# Patient Record
Sex: MALE | Race: WHITE | NOT HISPANIC OR LATINO | URBAN - METROPOLITAN AREA
[De-identification: names, ages, dates, MRNs, and addresses within clinical notes are randomized per-mention and may not be internally consistent; named-entity substitution may affect disease eponyms.]

---

## 2024-08-23 ENCOUNTER — APPOINTMENT (OUTPATIENT)
Dept: URBAN - METROPOLITAN AREA SURGERY 17 | Age: 69
Setting detail: DERMATOLOGY
End: 2024-08-26

## 2024-08-23 PROBLEM — C44.212 BASAL CELL CARCINOMA OF SKIN OF RIGHT EAR AND EXTERNAL AURICULAR CANAL: Status: ACTIVE | Noted: 2024-08-23

## 2024-08-23 PROCEDURE — OTHER CONSULTATION FOR MOHS SURGERY: OTHER

## 2024-08-23 PROCEDURE — OTHER MOHS SURGERY: OTHER

## 2024-08-23 PROCEDURE — 17312 MOHS ADDL STAGE: CPT

## 2024-08-23 PROCEDURE — 17311 MOHS 1 STAGE H/N/HF/G: CPT

## 2024-08-23 PROCEDURE — OTHER MIPS QUALITY: OTHER

## 2024-08-23 NOTE — PROCEDURE: MOHS SURGERY
Body Location Override (Optional - Billing Will Still Be Based On Selected Body Map Location If Applicable): right helix
Mohs Case Number: [id number]
Date Of Previous Biopsy (Optional): 7/11/2024
Previous Accession (Optional): [id number]
Biopsy Photograph Reviewed: Yes
Referring Physician (Optional): Judy Marley MD
Consent Type: Consent 1 (Standard)
Eye Shield Used: No
Initial Size Of Lesion: 0.5
X Size Of Lesion In Cm (Optional): 0.6
Number Of Stages: 3
Primary Defect Length In Cm (Final Defect Size - Required For Flaps/Grafts): 1
Primary Defect Width In Cm (Final Defect Size - Required For Flaps/Grafts): 0.9
Primary Defect Depth In Cm (Optional But Required For Some Insurers): 0
Repair Type: No repair - secondary intention
Which Instrument Did You Use For Dermabrasion?: Wire Brush
Which Eyelid Repair Cpt Are You Using?: 77912
Oculoplastic Surgeon Procedure Text (A): After obtaining clear surgical margins the patient was sent to oculoplastics for surgical repair.  The patient understands they will receive post-surgical care and follow-up from the referring physician's office.
Otolaryngologist Procedure Text (A): After obtaining clear surgical margins the patient was sent to otolaryngology for surgical repair.  The patient understands they will receive post-surgical care and follow-up from the referring physician's office.
Plastic Surgeon Procedure Text (A): After obtaining clear surgical margins the patient was sent to plastics for surgical repair.  The patient understands they will receive post-surgical care and follow-up from the referring physician's office.
Mid-Level Procedure Text (A): After obtaining clear surgical margins the patient was sent to a mid-level provider for surgical repair.  The patient understands they will receive post-surgical care and follow-up from the mid-level provider.
Provider Procedure Text (A): After obtaining clear surgical margins the defect was repaired by another provider.
Asc Procedure Text (A): After obtaining clear surgical margins the patient was sent to an ASC for surgical repair.  The patient understands they will receive post-surgical care and follow-up from the ASC physician.
Suturegard Retention Suture: 2-0 Nylon
Retention Suture Bite Size: 3 mm
Length To Time In Minutes Device Was In Place: 10
Undermining Type: Entire Wound
Debridement Text: The wound edges were debrided prior to proceeding with the closure to facilitate wound healing.
Helical Rim Text: The closure involved the helical rim.
Vermilion Border Text: The closure involved the vermilion border.
Nostril Rim Text: The closure involved the nostril rim.
Retention Suture Text: Retention sutures were placed to support the closure and prevent dehiscence.
Area H Indication Text: Tumors in this location are included in Area H (eyelids, eyebrows, nose, lips, chin, ear, pre-auricular, post-auricular, temple, genitalia, hands, feet, ankles and areola).  Tissue conservation is critical in these anatomic locations.
Area M Indication Text: Tumors in this location are included in Area M (cheek, forehead, scalp, neck, jawline and pretibial skin).  Mohs surgery is indicated for tumors in these anatomic locations.
Area L Indication Text: Tumors in this location are included in Area L (trunk and extremities).  Mohs surgery is indicated for larger tumors, or tumors with aggressive histologic features, in these anatomic locations.
Depth Of Tumor Invasion (For Histology): dermis
Perineural Invasion (For Histology - Be Specific If Possible): absent
Special Stains Stage 1 - Results: Base On Clearance Noted Above
Histology Selection Override (Optional- Will Default To Parent Diagnosis If N/A): Nodular Basal Cell Carcinoma
Stage 2: Additional Anesthesia Type: 1% lidocaine with epinephrine
Staging Info: By selecting yes to the question above you will include information on AJCC 8 tumor staging in your Mohs note. Information on tumor staging will be automatically added for SCCs on the head and neck. AJCC 8 includes tumor size, tumor depth, perineural involvement and bone invasion.
Tumor Depth: Less than 6mm from granular layer and no invasion beyond the subcutaneous fat
Why Was The Change Made?: Please Select the Appropriate Response
Medical Necessity Statement: The patient's skin cancer diagnosis, prognosis, and treatment options were discussed in detail, including nature of non-melanoma skin cancer, and its potential to metastasize, cause local anatomic tissue destruction, and other complications. Multiple treatment options were considered today to ensure Mohs is not only appropriate according to the AUC, but also the best treatment option considering the patient’s age, medical health, type and location of tumor, and other surgical risk factors. Other options such as standard excision, destruction, CO2 laser, XRT, and even no surgery were considered in the decision making process. The relative risks, benefits, possible complications, outcomes, and unique aspects of each method in the specific context of this cancer(s) were all evaluated. Mohs surgery was then chosen today as the best option, and a detailed verbal explanation of Mohs surgery was given.
Alternatives Discussed Intro (Do Not Add Period): I discussed alternative treatments to Mohs surgery and specifically discussed the risks and benefits of
Consent 1/Introductory Paragraph: The rationale for Mohs was explained to the patient and consent was obtained. The risks, benefits and alternatives to therapy were discussed in detail. Specifically, the risks of infection, scarring, bleeding, prolonged wound healing, incomplete removal, allergy to anesthesia, nerve injury and recurrence were addressed. Prior to the procedure, the treatment site was clearly identified and confirmed by the patient. All components of Universal Protocol/PAUSE Rule completed.
Consent 2/Introductory Paragraph: Mohs surgery was explained to the patient and consent was obtained. The risks, benefits and alternatives to therapy were discussed in detail. Specifically, the risks of infection, scarring, bleeding, prolonged wound healing, incomplete removal, allergy to anesthesia, nerve injury and recurrence were addressed. Prior to the procedure, the treatment site was clearly identified and confirmed by the patient. All components of Universal Protocol/PAUSE Rule completed.
Consent 3/Introductory Paragraph: I gave the patient a chance to ask questions they had about the procedure.  Following this I explained the Mohs procedure and consent was obtained. The risks, benefits and alternatives to therapy were discussed in detail. Specifically, the risks of infection, scarring, bleeding, prolonged wound healing, incomplete removal, allergy to anesthesia, nerve injury and recurrence were addressed. Prior to the procedure, the treatment site was clearly identified and confirmed by the patient. All components of Universal Protocol/PAUSE Rule completed.
Consent (Temporal Branch)/Introductory Paragraph: The rationale for Mohs was explained to the patient and consent was obtained. The risks, benefits and alternatives to therapy were discussed in detail. Specifically, the risks of damage to the temporal branch of the facial nerve, infection, scarring, bleeding, prolonged wound healing, incomplete removal, allergy to anesthesia, and recurrence were addressed. Prior to the procedure, the treatment site was clearly identified and confirmed by the patient. All components of Universal Protocol/PAUSE Rule completed.
Consent (Marginal Mandibular)/Introductory Paragraph: The rationale for Mohs was explained to the patient and consent was obtained. The risks, benefits and alternatives to therapy were discussed in detail. Specifically, the risks of damage to the marginal mandibular branch of the facial nerve, infection, scarring, bleeding, prolonged wound healing, incomplete removal, allergy to anesthesia, and recurrence were addressed. Prior to the procedure, the treatment site was clearly identified and confirmed by the patient. All components of Universal Protocol/PAUSE Rule completed.
Consent (Spinal Accessory)/Introductory Paragraph: The rationale for Mohs was explained to the patient and consent was obtained. The risks, benefits and alternatives to therapy were discussed in detail. Specifically, the risks of damage to the spinal accessory nerve, infection, scarring, bleeding, prolonged wound healing, incomplete removal, allergy to anesthesia, and recurrence were addressed. Prior to the procedure, the treatment site was clearly identified and confirmed by the patient. All components of Universal Protocol/PAUSE Rule completed.
Consent (Near Eyelid Margin)/Introductory Paragraph: The rationale for Mohs was explained to the patient and consent was obtained. The risks, benefits and alternatives to therapy were discussed in detail. Specifically, the risks of ectropion or eyelid deformity, infection, scarring, bleeding, prolonged wound healing, incomplete removal, allergy to anesthesia, nerve injury and recurrence were addressed. Prior to the procedure, the treatment site was clearly identified and confirmed by the patient. All components of Universal Protocol/PAUSE Rule completed.
Consent (Ear)/Introductory Paragraph: The rationale for Mohs was explained to the patient and consent was obtained. The risks, benefits and alternatives to therapy were discussed in detail. Specifically, the risks of ear deformity, infection, scarring, bleeding, prolonged wound healing, incomplete removal, allergy to anesthesia, nerve injury and recurrence were addressed. Prior to the procedure, the treatment site was clearly identified and confirmed by the patient. All components of Universal Protocol/PAUSE Rule completed.
Consent (Nose)/Introductory Paragraph: The rationale for Mohs was explained to the patient and consent was obtained. The risks, benefits and alternatives to therapy were discussed in detail. Specifically, the risks of nasal deformity, changes in the flow of air through the nose, infection, scarring, bleeding, prolonged wound healing, incomplete removal, allergy to anesthesia, nerve injury and recurrence were addressed. Prior to the procedure, the treatment site was clearly identified and confirmed by the patient. All components of Universal Protocol/PAUSE Rule completed.
Consent (Lip)/Introductory Paragraph: The rationale for Mohs was explained to the patient and consent was obtained. The risks, benefits and alternatives to therapy were discussed in detail. Specifically, the risks of lip deformity, changes in the oral aperture, infection, scarring, bleeding, prolonged wound healing, incomplete removal, allergy to anesthesia, nerve injury and recurrence were addressed. Prior to the procedure, the treatment site was clearly identified and confirmed by the patient. All components of Universal Protocol/PAUSE Rule completed.
Consent (Scalp)/Introductory Paragraph: The rationale for Mohs was explained to the patient and consent was obtained. The risks, benefits and alternatives to therapy were discussed in detail. Specifically, the risks of changes in hair growth pattern secondary to repair, infection, scarring, bleeding, prolonged wound healing, incomplete removal, allergy to anesthesia, nerve injury and recurrence were addressed. Prior to the procedure, the treatment site was clearly identified and confirmed by the patient. All components of Universal Protocol/PAUSE Rule completed.
Detail Level: Detailed
Postop Diagnosis: same
Surgeon: Riley Blanton MD
Anesthesia Type: 1% lidocaine with 1:100,000 epinephrine and 408mcg clindamycin/ml and a 1:10 solution of 8.4% sodium bicarbonate
Hemostasis: Electrocautery
Estimated Blood Loss (Cc): minimal
Anesthesia Type: 1% lidocaine with 1:100,000 epinephrine and 408mcg clindamycin/ml and a 1:3 solution of 8.4% sodium bicarbonate
Anesthesia Volume In Cc: 6
Brow Lift Text: A midfrontal incision was made medially to the defect to allow access to the tissues just superior to the left eyebrow. Following careful dissection inferiorly in a supraperiosteal plane to the level of the left eyebrow, several 3-0 monocryl sutures were used to resuspend the eyebrow orbicularis oculi muscular unit to the superior frontal bone periosteum. This resulted in an appropriate reapproximation of static eyebrow symmetry and correction of the left brow ptosis.
Deep Sutures: 5-0 Vicryl
Epidermal Sutures: 5-0 Fast Absorbing Gut
Epidermal Closure: running
Suturegard Intro: Intraoperative tissue expansion was performed, utilizing the SUTUREGARD device, in order to reduce wound tension.
Suturegard Body: The suture ends were repeatedly re-tightened and re-clamped to achieve the desired tissue expansion.
Hemigard Intro: Due to skin fragility and wound tension, it was decided to use HEMIGARD adhesive retention suture devices to permit a linear closure. The skin was cleaned and dried for a 6cm distance away from the wound. Excessive hair, if present, was removed to allow for adhesion.
Hemigard Postcare Instructions: The HEMIGARD strips are to remain completely dry for at least 5-7 days.
Donor Site Anesthesia Type: same as repair anesthesia
Graft Basting Suture (Optional): 5-0 Ethilon
Graft Donor Site Epidermal Sutures (Optional): cyanoacrylate
Epidermal Closure Graft Donor Site (Optional): simple interrupted
Graft Donor Site Bandage (Optional-Leave Blank If You Don't Want In Note): A pressure bandage were applied to the donor site.
Closure 2 Information: This tab is for additional flaps and grafts, including complex repair and grafts and complex repair and flaps. You can also specify a different location for the additional defect, if the location is the same you do not need to select a new one. We will insert the automated text for the repair you select below just as we do for solitary flaps and grafts. Please note that at this time if you select a location with a different insurance zone you will need to override the ICD10 and CPT if appropriate.
Closure 3 Information: This tab is for additional flaps and grafts above and beyond our usual structured repairs.  Please note if you enter information here it will not currently bill and you will need to add the billing information manually.
Wound Care: A&D Ointment
Dressing: pressure dressing with telfa
Wound Care (No Sutures): Petrolatum
Dressing (No Sutures): dry sterile dressing
Wound Check: 21 days
Unna Boot Text: An Unna boot was placed to help immobilize the limb and facilitate more rapid healing.
Home Suture Removal Text: Patient was provided instructions on removing sutures and will remove their sutures at home.  If they have any questions or difficulties they will call the office.
Post-care instructions were reviewed in detail. A handout was provided. Patient is not to engage in any heavy lifting, exercise, or swimming for the next 14 days. Should the patient develop any fevers, chills, bleeding, severe pain patient will contact the office immediately.\\n\\nA dose of both Tylenol 1000mg and ibuprofen 400mg (at the same time) was recommended shortly after surgery, and every 6 hours as needed for pain. Pain not adequately relieved or severe pain should be reported to our office or the provider on call. Avoid any additional aspirin containing products. Cell phone numbers of the providers were handed out. \\n\\nIce packs should also be used post surgically and may be placed over the wound dressing to help with pain and swelling, and bleeding. The ice pack is placed over the wound for fifteen minutes and may be repeated four to six times per day for 2-3 days.
Opioid Counseling: The patient was advised that opioid pain medications are addictive, can cause nausea, and vomiting, and are rarely needed after Mohs surgery. Proper use of acetaminophen, ibuprofen, and ice is usually sufficient.
Pain Refusal Text: I offered to prescribe pain medication but the patient refused to take this medication.
Mauc Instructions: By selecting yes to the question below the MAUC number will be added into the note.  This will be calculated automatically based on the diagnosis chosen, the size entered, the body zone selected (H,M,L) and the specific indications you chose. You will also have the option to override the Mohs AUC if you disagree with the automatically calculated number and this option is found in the Case Summary tab.
Where Do You Want The Question To Include Opioid Counseling Located?: Case Summary Tab
Eye Protection Verbiage: Before proceeding with the stage, a plastic scleral shield was inserted. The globe was anesthetized with a few drops of proparacaine. Then, an appropriate sized scleral shield was chosen and coated with lacrilube ointment. The shield was gently inserted and left in place for the duration of each stage. After the stage was completed, the shield was gently removed.
Mohs Method Verbiage: A  time out was conducted by the surgical team prior to incision to ensure that the correct patient, location, and diagnosis were all confirmed before the procedure was started. Patient was positioned for optimal exposure of surgical site and to patient's comfort. Skin was cleansed and anesthetized as noted above.  A thin layer of tissue was surgically excised using a #15 blade by Dr. Blanton. The tissue specimen was then transferred onto a gauze pad, maintaining the orientation of the specimen. The gauze was placed with the fold superior. Hemostasis was obtained using electro-coagulation. The wound site was then covered with a dressing while tissue samples were processed for examination.
Surgeon/Pathologist Verbiage (Will Incorporate Name Of Surgeon From Intro If Not Blank): operated in two distinct and integrated capacities as the surgeon and pathologist.
Mohs Histo Method Verbiage: A reference map was drawn during the excision, and the excised tissue was cut into sections for frozen sectioning in the histologic laboratory. Two adjacent edges of each section were then dyed in order to achieve precise orientation. Orange, blue, and black dyes were used with black dyed edges correlating to broken lines and orange edges with lines of \"O\" shapes and blue edges with lines of \"+\" symbols on the surgical map. The tissue sections were processed using horizontal sectioning of the base and continuous peripheral margins. \\n\\nA  time out was conducted by Dr Blanton and the laboratory team to ensure that the correct patient, diagnosis and location, and Stage letter/number and Section number were all confirmed before the slides were reviewed. The team double checked these four areas before proceeding with slide review. Dr. Blanton then examined the prepared microscopic sections in conjunction with the reference map. Any areas of residual tumor were indicated on the reference map with red pencil, pinpointing the location in which further tissue excision was necessary. This was carried out at the start of the slide review and for each subsequent layer of Mohs surgery.
Subsequent Stages Histo Method Verbiage: Tumor remains evident in the tissue sections examined microscopically from the previous stage as indicated by red markings on the map. The patient was returned to appropriate surgical positioning and prepped. Surgery was directed to the area(s) having residual tumor, with thin layers of tissue being excised as indicated on the new reference map. Precise orientation maintained as previously described. Hemostasis achieved. The excised tissue was again oriented, mapped, dyed, and processed for microscopic analysis in the same manner described above. The tissue was then microscopically examined by Dr. Blanton.
Mohs Rapid Report Verbiage: The area of clinically evident tumor was marked with skin marking ink and appropriately hatched.  The initial incision was made following the Mohs approach through the skin.  The specimen was taken to the lab, divided into the necessary number of pieces, chromacoded and processed according to the Mohs protocol.  This was repeated in successive stages until a tumor free defect was achieved.
Complex Repair Preamble Text (Leave Blank If You Do Not Want): Local anesthesia was obtained. The tissue surrounding the operative site was undermined extensively with blunt scissor dissection to a distance greater than the maximum width of the defect measured perpendicular to the closure line. Hemostasis was obtained using electrocoagulation. The orientation of the closure was placed within the relaxed skin tension lines. The superficial fascia and subcutaneous layers were first closed using buried vertical mattress sutures. The epidermis was then approximated. Careful attention was given to eversion and even approximation of the edges. Standing tissue cones were removed as necessary by standard technique. A pressure dressing was applied.\\n\\nExtensive undermining in this particular location and situation will allow a broader plate-like scar to optimize even scar contracture, and minimize scar spread and standing cone formation for the best cosmetic outcome. It will also reduce tension on the epidermal suture line and result in optimal healing of the surgical site.
Crescentic Complex Repair Preamble Text (Leave Blank If You Do Not Want): Extensive wide undermining was performed.
Intermediate Repair Preamble Text (Leave Blank If You Do Not Want): Local anesthesia was obtained. The tissue surrounding the operative site was undermined with blunt scissor dissection. Hemostasis was obtained using electrocoagulation. The orientation of the closure was placed within the relaxed skin tension lines. The superficial fascia and subcutaneous layers were first closed with buried vertical mattress sutures. The epidermis was then approximated. Careful attention was given to eversion and even approximation of the edges. Standing tissue cones were removed as necessary by standard technique. A pressure dressing was applied.
Crescentic Intermediate Repair Preamble Text (Leave Blank If You Do Not Want): Undermining was performed with blunt dissection.
Graft Cartilage Fenestration Text: The cartilage was fenestrated with a 2mm punch biopsy to help facilitate graft survival and healing.
Non-Graft Cartilage Fenestration Text: The cartilage was fenestrated with a 2mm punch biopsy to help facilitate healing.
Secondary Intention Text (Leave Blank If You Do Not Want): The defect will heal with secondary intention.
No Repair - Repaired With Adjacent Surgical Defect Text (Leave Blank If You Do Not Want): After obtaining clear surgical margins the defect was repaired concurrently with another surgical defect which was in close approximation.
Unique Flap 1 Name: Island Pedicle Transposition Flap
Unique Flap 2 Name: Postauricular Pedicle Take Down and Insert
Unique Flap 3 Name: Postauricular Pull Through Flap
Unique Flap 1 Text: An Island Pedicle Transposition Flap was designed. Local anesthesia was obtained. The flap was incised to the underlying adipose tissue except proximally where only epidermis was removed, and the tissue surrounding the operative site was undermined extensively with blunt scissor dissection. The flap was also undermined in the subcutaneous fat at the inferior pole to increase mobility leaving the flap based on a single superior pedicle with a muscular base. Hemostasis was obtained using electro-coagulation. The flap was transposed and secured into place with Prolene sutures. The superficial fascia and subcutaneous layers of the donor site were closed with buried vertical mattress sutures. The epidermis was then approximated with Prolene sutures. Careful attention was given to eversion and even approximation of the edges. A pressure dressing was applied.
Unique Flap 2 Text: Local anesthesia was obtained. The pedicle flap was divided from its base and trimmed to fit the remaining portion of the defect on the ear. Hemostasis was obtained using electrocoagulation. It was sewn into place using non-absorbable sutures. Careful attention was given to eversion and even approximation of the wound edges. A pressure dressing was applied.
Unique Flap 3 Text: A postauricular pull-through flap was designed. Local anesthesia was obtained. The flap was incised to the underlying adipose tissue and undermined in the subcutaneous fat at each pole to increase mobility leaving the center based on a single pedicle. Hemostasis was obtained using electrocoagulation. The flap was pulled through the post auricular crease into the anterior side of the ear and secured with non-absorbable sutures. The donor area was closed with buried vertical mattress sutures. The epidermis was then approximated with non-absorbable sutures. Careful attention was given to eversion and even approximation of the edges. A pressure dressing was applied.
Adjacent Tissue Transfer Text: The defect edges were debeveled with a #15 scalpel blade.  Given the location of the defect and the proximity to free margins an adjacent tissue transfer was deemed most appropriate.  Using a sterile surgical marker, an appropriate flap was drawn incorporating the defect and placing the expected incisions within the relaxed skin tension lines where possible.    The area thus outlined was incised deep to adipose tissue with a #15 scalpel blade.  The skin margins were undermined to an appropriate distance in all directions utilizing iris scissors.
Advancement Flap (Single) Text: An advancement flap was designed. Local anesthesia was obtained. The defect edges were debeveled with a #15 scalpel blade. The flap was incised to the underlying adipose tissue. The flap was then undermined and elevated. The tissue surrounding the operative site was undermined extensively with blunt scissor dissection. Hemostasis was obtained using electrocoagulation. The flap was then advanced into the primary defect. Raised tissue cones were removed as necessary by standard technique. The superficial fascia and subcutaneous layers were closed with buried vertical mattress sutures. The epidermis was then approximated. Careful attention was given to eversion and even approximation of the wound edges. A pressure dressing was applied.
Advancement Flap (Double) Text: A bilateral advancement flap was designed. Local anesthesia was obtained. The defect edges were debeveled with a #15 scalpel blade. The flaps were incised to the underlying adipose tissue. The flaps were then undermined and elevated. The tissue surrounding the operative site was undermined extensively with blunt scissor dissection. Hemostasis was obtained using electrocoagulation. The flaps were then advanced into the primary defect. Raised tissue cones were removed as necessary by standard technique. The superficial fascia and subcutaneous layers were closed with buried vertical mattress sutures. The epidermis was then approximated with nylon sutures. Careful attention was given to eversion and even approximation of the wound edges. A pressure dressing was applied.
Advancement-Rotation Flap Text: The defect edges were debeveled with a #15 scalpel blade.  Given the location of the defect, shape of the defect and the proximity to free margins an advancement-rotation flap was deemed most appropriate.  Using a sterile surgical marker, an appropriate flap was drawn incorporating the defect and placing the expected incisions within the relaxed skin tension lines where possible. The area thus outlined was incised deep to adipose tissue with a #15 scalpel blade.  The skin margins were undermined to an appropriate distance in all directions utilizing iris scissors.
Alar Island Pedicle Flap Text: The defect edges were debeveled with a #15 scalpel blade.  Given the location of the defect, shape of the defect and the proximity to the alar rim an island pedicle advancement flap was deemed most appropriate.  Using a sterile surgical marker, an appropriate advancement flap was drawn incorporating the defect, outlining the appropriate donor tissue and placing the expected incisions within the nasal ala running parallel to the alar rim. The area thus outlined was incised with a #15 scalpel blade.  The skin margins were undermined minimally to an appropriate distance in all directions around the primary defect and laterally outward around the island pedicle utilizing iris scissors.  There was minimal undermining beneath the pedicle flap.
A-T Advancement Flap Text: The defect edges were debeveled with a #15 scalpel blade.  Given the location of the defect, shape of the defect and the proximity to free margins an A-T advancement flap was deemed most appropriate.  Using a sterile surgical marker, an appropriate advancement flap was drawn incorporating the defect and placing the expected incisions within the relaxed skin tension lines where possible.    The area thus outlined was incised deep to adipose tissue with a #15 scalpel blade.  The skin margins were undermined to an appropriate distance in all directions utilizing iris scissors.
Banner Transposition Flap Text: The defect edges were debeveled with a #15 scalpel blade.  Given the location of the defect and the proximity to free margins a Banner transposition flap was deemed most appropriate.  Using a sterile surgical marker, an appropriate flap drawn around the defect. The area thus outlined was incised deep to adipose tissue with a #15 scalpel blade.  The skin margins were undermined to an appropriate distance in all directions utilizing iris scissors.
Bilateral Helical Rim Advancement Flap Text: A bilateral helical advancement flap was designed. Local anesthesia was obtained. The tissue surrounding the operative site was undermined extensively with blunt scissor dissection. A single incision along the helical sulcus was made to the underlying adipose tissue. The flap was then undermined along the cartilage in the subcutaneous fat and elevated. Hemostasis was obtained using electrocoagulation. The flap was then advanced into the primary defect. Raised tissue cones were removed as necessary by standard technique. The superficial fascia and subcutaneous layers were closed with buried vertical mattress sutures. The epidermis was then approximated with sutures. Careful attention was given to eversion and even approximation of the wound edges. A pressure dressing was applied.
Bilateral Rotation Flap Text: The defect edges were debeveled with a #15 scalpel blade. Given the location of the defect, shape of the defect and the proximity to free margins a bilateral rotation flap was deemed most appropriate. Using a sterile surgical marker, an appropriate rotation flap was drawn incorporating the defect and placing the expected incisions within the relaxed skin tension lines where possible. The area thus outlined was incised deep to adipose tissue with a #15 scalpel blade. The skin margins were undermined to an appropriate distance in all directions utilizing iris scissors. Following this, the designed flap was carried over into the primary defect and sutured into place.
Bilobed Flap Text: A geometric bilobe transposition flap was designed (Zitelli, JA. Arch Dermatol 1986). Local anesthesia was obtained. The flap was incised to the underlying adipose tissue. The flap was then undermined and elevated to its pedicle base. The entire tissue surrounding the operative site was undermined extensively with blunt scissor dissection. On the nose and medial cheek this was performed at the level of the periosteum. Hemostasis was obtained using electrocoagulation. The primary lobe was then completely defatted using blunt scissor dissection. The secondary lobe was transposed to cover the secondary defect created by the first lobe of the flap. The primary lobe of the flap was then transposed over the primary defect. Both lobes of the flap were trimmed to fit the recipient sites as needed. The superficial fascia and subcutaneous layers were closed with buried vertical mattress sutures. The epidermis was approximated with prolene sutures. Careful attention was given to eversion and even approximation of the wound edges. A pressure dressing was applied.
Bi-Rhombic Flap Text: The defect edges were debeveled with a #15 scalpel blade.  Given the location of the defect and the proximity to free margins a bi-rhombic flap was deemed most appropriate.  Using a sterile surgical marker, an appropriate rhombic flap was drawn incorporating the defect. The area thus outlined was incised deep to adipose tissue with a #15 scalpel blade.  The skin margins were undermined to an appropriate distance in all directions utilizing iris scissors.
Burow's Advancement Flap Text: The defect edges were debeveled with a #15 scalpel blade.  Given the location of the defect and the proximity to free margins a Burow's advancement flap was deemed most appropriate.  Using a sterile surgical marker, the appropriate advancement flap was drawn incorporating the defect and placing the expected incisions within the relaxed skin tension lines where possible.    The area thus outlined was incised deep to adipose tissue with a #15 scalpel blade.  The skin margins were undermined to an appropriate distance in all directions utilizing iris scissors.
Chonodrocutaneous Helical Advancement Flap Text: The defect edges were debeveled with a #15 scalpel blade.  Given the location of the defect and the proximity to free margins a chondrocutaneous helical advancement flap was deemed most appropriate.  Using a sterile surgical marker, the appropriate advancement flap was drawn incorporating the defect and placing the expected incisions within the relaxed skin tension lines where possible.    The area thus outlined was incised deep to adipose tissue with a #15 scalpel blade.  The skin margins were undermined to an appropriate distance in all directions utilizing iris scissors.
Crescentic Advancement Flap Text: The defect edges were debeveled with a #15 scalpel blade.  A perialar crescentic advancement flap was designed. Local anesthesia was obtained. The tissue surrounding the operative site was undermined extensively with blunt scissor dissection. The flap was incised down to the subcutaneous fat removing the crescentic area of tissue immediately above the primary defect. Hemostasis was obtained using electrocoagulation. The flap was then advanced into the primary defect by closing the secondary defect. Several tension relieving buried sutures were placed at the nasolabial junction and medial cheek to recreate this line. The superficial fascia and subcutaneous layers were closed with buried vertical mattress sutures. The epidermis was then approximated with nylon sutures. Careful attention was given to eversion and even approximation of the wound edges. A pressure dressing was applied.
Dorsal Nasal Flap Text: The defect edges were debeveled with a #15 scalpel blade.  Given the location of the defect and the proximity to free margins a dorsal nasal flap was deemed most appropriate.  Using a sterile surgical marker, an appropriate dorsal nasal flap was drawn around the defect.    The area thus outlined was incised deep to adipose tissue with a #15 scalpel blade.  The skin margins were undermined to an appropriate distance in all directions utilizing iris scissors.
Double Island Pedicle Flap Text: The defect edges were debeveled with a #15 scalpel blade.  Given the location of the defect, shape of the defect and the proximity to free margins a double island pedicle advancement flap was deemed most appropriate.  Using a sterile surgical marker, an appropriate advancement flap was drawn incorporating the defect, outlining the appropriate donor tissue and placing the expected incisions within the relaxed skin tension lines where possible.    The area thus outlined was incised deep to adipose tissue with a #15 scalpel blade.  The skin margins were undermined to an appropriate distance in all directions around the primary defect and laterally outward around the island pedicle utilizing iris scissors.  There was minimal undermining beneath the pedicle flap.
Double O-Z Flap Text: The defect edges were debeveled with a #15 scalpel blade.  Given the location of the defect, shape of the defect and the proximity to free margins a Double O-Z flap was deemed most appropriate.  Using a sterile surgical marker, an appropriate transposition flap was drawn incorporating the defect and placing the expected incisions within the relaxed skin tension lines where possible. The area thus outlined was incised deep to adipose tissue with a #15 scalpel blade.  The skin margins were undermined to an appropriate distance in all directions utilizing iris scissors.
Double O-Z Plasty Text: The defect edges were debeveled with a #15 scalpel blade.  Given the location of the defect, shape of the defect and the proximity to free margins a Double O-Z plasty (double transposition flap) was deemed most appropriate.  Using a sterile surgical marker, the appropriate transposition flaps were drawn incorporating the defect and placing the expected incisions within the relaxed skin tension lines where possible. The area thus outlined was incised deep to adipose tissue with a #15 scalpel blade.  The skin margins were undermined to an appropriate distance in all directions utilizing iris scissors.  Hemostasis was achieved with electrocautery.  The flaps were then transposed into place, one clockwise and the other counterclockwise, and anchored with interrupted buried subcutaneous sutures.
Double Z Plasty Text: The lesion was extirpated to the level of the fat with a #15 scalpel blade. Given the location of the defect, shape of the defect and the proximity to free margins a double Z-plasty was deemed most appropriate for repair. Using a sterile surgical marker, the appropriate transposition arms of the double Z-plasty were drawn incorporating the defect and placing the expected incisions within the relaxed skin tension lines where possible. The area thus outlined was incised deep to adipose tissue with a #15 scalpel blade. The skin margins were undermined to an appropriate distance in all directions utilizing iris scissors. The opposing transposition arms were then transposed and carried over into place in opposite direction and anchored with interrupted buried subcutaneous sutures.
Ear Star Wedge Flap Text: The defect edges were debeveled with a #15 blade scalpel.  Given the location of the defect and the proximity to free margins (helical rim) an ear star wedge flap was deemed most appropriate.  Using a sterile surgical marker, the appropriate flap was drawn incorporating the defect and placing the expected incisions between the helical rim and antihelix where possible.  The area thus outlined was incised through and through with a #15 scalpel blade.
Flip-Flop Flap Text: The defect edges were debeveled with a #15 blade scalpel.  Given the location of the defect and the proximity to free margins a flip-flop flap was deemed most appropriate. Using a sterile surgical marker, the appropriate flap was drawn incorporating the defect and placing the expected incisions between the helical rim and antihelix where possible.  The area thus outlined was incised through and through with a #15 scalpel blade. Following this, the designed flap was carried over into the primary defect and sutured into place.
Hatchet Flap Text: The defect edges were debeveled with a #15 scalpel blade.  Given the location of the defect, shape of the defect and the proximity to free margins a hatchet flap was deemed most appropriate.  Using a sterile surgical marker, an appropriate hatchet flap was drawn incorporating the defect and placing the expected incisions within the relaxed skin tension lines where possible.    The area thus outlined was incised deep to adipose tissue with a #15 scalpel blade.  The skin margins were undermined to an appropriate distance in all directions utilizing iris scissors.
Helical Rim Advancement Flap Text: A helical advancement flap was designed. Local anesthesia was obtained. The tissue surrounding the operative site was undermined extensively with blunt scissor dissection. A single incision along the helical sulcus was made to the underlying adipose tissue. The flap was then undermined along the cartilage in the subcutaneous fat and elevated. Hemostasis was obtained using electrocoagulation. The flap was then advanced into the primary defect. Raised tissue cones were removed as necessary by standard technique. The superficial fascia and subcutaneous layers were closed with buried vertical mattress sutures. The epidermis was then approximated with sutures. Careful attention was given to eversion and even approximation of the wound edges. A pressure dressing was applied.
H Plasty Text: Given the location of the defect, shape of the defect and the proximity to free margins a H-plasty was deemed most appropriate for repair.  Using a sterile surgical marker, the appropriate advancement arms of the H-plasty were drawn incorporating the defect and placing the expected incisions within the relaxed skin tension lines where possible. The area thus outlined was incised deep to adipose tissue with a #15 scalpel blade. The skin margins were undermined to an appropriate distance in all directions utilizing iris scissors.  The opposing advancement arms were then advanced into place in opposite direction and anchored with interrupted buried subcutaneous sutures.
Island Pedicle Flap Text: The defect edges were debeveled with a #15 scalpel blade.  Given the location of the defect, shape of the defect and the proximity to free margins an island pedicle advancement flap was deemed most appropriate.  Using a sterile surgical marker, an appropriate advancement flap was drawn incorporating the defect, outlining the appropriate donor tissue and placing the expected incisions within the relaxed skin tension lines where possible.    The area thus outlined was incised deep to adipose tissue with a #15 scalpel blade.  The skin margins were undermined to an appropriate distance in all directions around the primary defect and laterally outward around the island pedicle utilizing iris scissors.  There was minimal undermining beneath the pedicle flap.
Island Pedicle Flap With Canthal Suspension Text: The defect edges were debeveled with a #15 scalpel blade.  Given the location of the defect, shape of the defect and the proximity to free margins an island pedicle advancement flap was deemed most appropriate.  Using a sterile surgical marker, an appropriate advancement flap was drawn incorporating the defect, outlining the appropriate donor tissue and placing the expected incisions within the relaxed skin tension lines where possible. The area thus outlined was incised deep to adipose tissue with a #15 scalpel blade.  The skin margins were undermined to an appropriate distance in all directions around the primary defect and laterally outward around the island pedicle utilizing iris scissors.  There was minimal undermining beneath the pedicle flap. A suspension suture was placed in the canthal tendon to prevent tension and prevent ectropion.
Island Pedicle Flap-Requiring Vessel Identification Text: The defect edges were debeveled with a #15 scalpel blade.  Given the location of the defect, shape of the defect and the proximity to free margins an island pedicle advancement flap was deemed most appropriate.  Using a sterile surgical marker, an appropriate advancement flap was drawn, based on the axial vessel mentioned above, incorporating the defect, outlining the appropriate donor tissue and placing the expected incisions within the relaxed skin tension lines where possible.    The area thus outlined was incised deep to adipose tissue with a #15 scalpel blade.  The skin margins were undermined to an appropriate distance in all directions around the primary defect and laterally outward around the island pedicle utilizing iris scissors.  There was minimal undermining beneath the pedicle flap.
Keystone Flap Text: The defect edges were debeveled with a #15 scalpel blade.  Given the location of the defect, shape of the defect a keystone flap was deemed most appropriate.  Using a sterile surgical marker, an appropriate keystone flap was drawn incorporating the defect, outlining the appropriate donor tissue and placing the expected incisions within the relaxed skin tension lines where possible. The area thus outlined was incised deep to adipose tissue with a #15 scalpel blade.  The skin margins were undermined to an appropriate distance in all directions around the primary defect and laterally outward around the flap utilizing iris scissors.
Melolabial Transposition Flap Text: The defect edges were debeveled with a #15 scalpel blade.  A nasolabial transposition flap was designed as a single stage procedure. The incision lines were designed along the nasolabial fold and medial cheek. Removal of the superior tissue cone was designed by triangulation with the apex pointed toward the medial canthus. Local anesthesia was obtained. The flap was incised to the underlying adipose tissue. The flap was then undermined and elevated. The tissue surrounding the operative site was undermined extensively with blunt scissor dissection. On the nose and medial cheek this was performed at the level of the periosteum. Hemostasis was obtained using electrocoagulation. The flap was then completely defatted using blunt scissor dissection. The cheek was then advanced medially with several periosteal tacking sutures, moving the flap into the primary defect. The nasolabial fold was recreated by a periosteal tacking suture placed parallel at the base of the flap. The secondary defect was then closed by layered closure. The primary defect wound bed was trimmed as necessary to create a concave surface as the flap was sutured into place. The superficial fascia and subcutaneous layers were closed with  buried vertical mattress sutures. The epidermis was approximated with sutures. Careful attention was given to eversion and even approximation of the wound edges. A pressure dressing was applied.
Mercedes Flap Text: The defect edges were debeveled with a #15 scalpel blade.  Given the location of the defect, shape of the defect and the proximity to free margins a Mercedes flap was deemed most appropriate.  Using a sterile surgical marker, an appropriate advancement flap was drawn incorporating the defect and placing the expected incisions within the relaxed skin tension lines where possible. The area thus outlined was incised deep to adipose tissue with a #15 scalpel blade.  The skin margins were undermined to an appropriate distance in all directions utilizing iris scissors.
Modified Advancement Flap Text: The defect edges were debeveled with a #15 scalpel blade.  Given the location of the defect, shape of the defect and the proximity to free margins a modified advancement flap was deemed most appropriate.  Using a sterile surgical marker, an appropriate advancement flap was drawn incorporating the defect and placing the expected incisions within the relaxed skin tension lines where possible.    The area thus outlined was incised deep to adipose tissue with a #15 scalpel blade.  The skin margins were undermined to an appropriate distance in all directions utilizing iris scissors.
Mucosal Advancement Flap Text: Given the location of the defect, shape of the defect and the proximity to free margins a mucosal advancement flap was deemed most appropriate. Incisions were made with a 15 blade scalpel in the appropriate fashion along the cutaneous vermilion border and the mucosal lip. The remaining actinically damaged mucosal tissue was excised.  The mucosal advancement flap was then elevated to the gingival sulcus with care taken to preserve the neurovascular structures and advanced into the primary defect. Care was taken to ensure that precise realignment of the vermilion border was achieved.
Muscle Hinge Flap Text: The defect edges were debeveled with a #15 scalpel blade.  Given the size, depth and location of the defect and the proximity to free margins a muscle hinge flap was deemed most appropriate.  Using a sterile surgical marker, an appropriate hinge flap was drawn incorporating the defect. The area thus outlined was incised with a #15 scalpel blade.  The skin margins were undermined to an appropriate distance in all directions utilizing iris scissors. The flap was then moved into the primary defect.
Mustarde Flap Text: The defect edges were debeveled with a #15 scalpel blade.  Given the size, depth and location of the defect and the proximity to free margins a Mustarde flap was deemed most appropriate.  Using a sterile surgical marker, an appropriate flap was drawn incorporating the defect. The area thus outlined was incised with a #15 scalpel blade.  The skin margins were undermined to an appropriate distance in all directions utilizing iris scissors.
Nasal Turnover Hinge Flap Text: The defect edges were debeveled with a #15 scalpel blade.  Given the size, depth, location of the defect and the defect being full thickness a nasal turnover hinge flap was deemed most appropriate.  Using a sterile surgical marker, an appropriate hinge flap was drawn incorporating the defect. The area thus outlined was incised with a #15 scalpel blade. The flap was designed to recreate the nasal mucosal lining and the alar rim. The skin margins were undermined to an appropriate distance in all directions utilizing iris scissors.
Nasalis-Muscle-Based Myocutaneous Island Pedicle Flap Text: The defect edges were debeveled with a #15 scalpel blade.  Given the size, depth and location of the defect and the proximity to free margins a muscle island pedicle flap was deemed most appropriate.  Using a sterile surgical marker, an appropriate flap was drawn incorporating the defect. The area thus outlined was incised with a #15 scalpel blade.  The skin margins were undermined to an appropriate distance in all directions utilizing iris scissors. The flap was designed to incorporate a medially based hinge based upon the nasalis muscles. Hemostasis was obtained using electrocoagulation. The flap was then advanced into the primary defect. Raised tissue cones were removed as necessary by standard technique. The superficial fascia and subcutaneous layers were closed with buried vertical mattress sutures. The epidermis was then approximated with sutures. Careful attention was given to eversion and even approximation of the wound edges. A pressure dressing was applied.
Nasalis Myocutaneous Flap Text: Using a #15 blade, an incision was made around the donor flap to the level of the nasalis muscle. Wide lateral undermining was then performed in both the subcutaneous plane above the nasalis muscle, and in a submuscular plane just above periosteum. This allowed the formation of a free nasalis muscle axial pedicle which was still attached to the actual cutaneous flap, increasing its mobility and vascular viability. Hemostasis was obtained with pinpoint electrocoagulation. The flap was mobilized into position and the pivotal anchor points positioned and stabilized with buried interrupted sutures. Subcutaneous and dermal tissues were closed in a multilayered fashion with sutures. Tissue redundancies were excised, and the epidermal edges were apposed without significant tension and sutured with sutures.
Nasolabial Transposition Flap Text: The defect edges were debeveled with a #15 scalpel blade.  Given the size, depth and location of the defect and the proximity to free margins a nasolabial transposition flap was deemed most appropriate. Using a sterile surgical marker, an appropriate flap was drawn incorporating the defect. The area thus outlined was incised with a #15 scalpel blade. The skin margins were undermined to an appropriate distance in all directions utilizing iris scissors. Following this, the designed flap was carried into the primary defect and sutured into place.
Orbicularis Oris Muscle Flap Text: The defect edges were debeveled with a #15 scalpel blade.  Given that the defect affected the competency of the oral sphincter an orbicularis oris muscle flap was deemed most appropriate to restore this competency and normal muscle function.  Using a sterile surgical marker, an appropriate flap was drawn incorporating the defect. The area thus outlined was incised with a #15 scalpel blade.
O-T Advancement Flap Text: An O-T advancement flap was designed. Local anesthesia was obtained. The flap was incised to the underlying adipose tissue. The flap was then undermined and elevated. The tissue surrounding the operative site was undermined extensively with blunt scissor dissection. Hemostasis was obtained using electrocoagulation. Raised tissue cones were removed as necessary by standard technique. The superficial fascia and subcutaneous layers were closed with buried vertical mattress sutures. The epidermis was then approximated. Careful attention was given to eversion and even approximation of the wound edges. A pressure dressing was applied.
O-T Plasty Text: The defect edges were debeveled with a #15 scalpel blade.  Given the location of the defect, shape of the defect and the proximity to free margins an O-T plasty was deemed most appropriate.  Using a sterile surgical marker, an appropriate O-T plasty was drawn incorporating the defect and placing the expected incisions within the relaxed skin tension lines where possible.    The area thus outlined was incised deep to adipose tissue with a #15 scalpel blade.  The skin margins were undermined to an appropriate distance in all directions utilizing iris scissors.
O-L Flap Text: An advancement flap was designed O-L. Local anesthesia was obtained. The defect edges were debeveled with a #15 scalpel blade. The flap was incised to the underlying adipose tissue. The flap was then undermined and elevated. The tissue surrounding the operative site was undermined extensively with blunt scissor dissection. Hemostasis was obtained using electrocoagulation. The flap was then advanced into the primary defect. Raised tissue cones were removed as necessary by standard technique. The superficial fascia and subcutaneous layers were closed with buried vertical mattress sutures. The epidermis was then approximated. Careful attention was given to eversion and even approximation of the wound edges. A pressure dressing was applied.
O-Z Flap Text: An advancement flap was designed O-Z. Local anesthesia was obtained. The defect edges were debeveled with a #15 scalpel blade. The flap was incised to the underlying adipose tissue. The flap was then undermined and elevated. The tissue surrounding the operative site was undermined extensively with blunt scissor dissection. Hemostasis was obtained using electrocoagulation. The flap was then advanced into the primary defect. Raised tissue cones were removed as necessary by standard technique. The superficial fascia and subcutaneous layers were closed with buried vertical mattress sutures. The epidermis was then approximated. Careful attention was given to eversion and even approximation of the wound edges. A pressure dressing was applied.
O-Z Plasty Text: The defect edges were debeveled with a #15 scalpel blade.  Given the location of the defect, shape of the defect and the proximity to free margins an O-Z plasty (double transposition flap) was deemed most appropriate.  Using a sterile surgical marker, the appropriate transposition flaps were drawn incorporating the defect and placing the expected incisions within the relaxed skin tension lines where possible.    The area thus outlined was incised deep to adipose tissue with a #15 scalpel blade.  The skin margins were undermined to an appropriate distance in all directions utilizing iris scissors.  Hemostasis was achieved with electrocautery.  The flaps were then transposed into place, one clockwise and the other counterclockwise, and anchored with interrupted buried subcutaneous sutures.
Peng Advancement Flap Text: The defect edges were debeveled with a #15 scalpel blade.  Given the location of the defect, shape of the defect and the proximity to free margins a Peng advancement flap was deemed most appropriate.  Using a sterile surgical marker, an appropriate advancement flap was drawn incorporating the defect and placing the expected incisions within the relaxed skin tension lines where possible. The area thus outlined was incised deep to adipose tissue with a #15 scalpel blade.  The skin margins were undermined to an appropriate distance in all directions utilizing iris scissors.
Rectangular Flap Text: The defect edges were debeveled with a #15 scalpel blade. Given the location of the defect and the proximity to free margins a rectangular flap was deemed most appropriate. Using a sterile surgical marker, an appropriate rectangular flap was drawn incorporating the defect. The area thus outlined was incised deep to adipose tissue with a #15 scalpel blade. The skin margins were undermined to an appropriate distance in all directions utilizing iris scissors. Following this, the designed flap was carried over into the primary defect and sutured into place.
Rhombic Flap Text: A rhombic transposition flap was designed. Local anesthesia was obtained. The tissue surrounding the operative site was undermined extensively with blunt scissor dissection. The flap was incised to the underlying adipose tissue. The flap was then undermined, elevated, and defatted. Hemostasis was obtained using electrocoagulation. The secondary defect was first closed by complex layered closure, moving the flap into the primary defect. The superficial fascia and subcutaneous layers were closed with buried vertical mattress sutures. The epidermis was then approximated with sutures. Careful attention was given to eversion and even approximation of the wound edges. A pressure dressing was applied.
Rotation Flap Text: A rotation flap was designed. Local anesthesia was obtained. The defect edges were debeveled with a #15 scalpel blade. The flap was incised to the underlying adipose tissue. The flap was then undermined and elevated. The tissue surrounding the operative site was undermined extensively with blunt scissor dissection. Hemostasis was obtained using electrocoagulation. The flap was then rotated into the primary defect. Raised tissue cones were removed as necessary by standard technique. The superficial fascia and subcutaneous layers were closed with buried vertical mattress sutures. The epidermis was then approximated with sutures. Careful attention was given to eversion and even approximation of the wound edges. A pressure dressing was applied.
Spiral Flap Text: The defect edges were debeveled with a #15 scalpel blade.  Given the location of the defect, shape of the defect and the proximity to free margins a spiral flap was deemed most appropriate.  Using a sterile surgical marker, an appropriate rotation flap was drawn incorporating the defect and placing the expected incisions within the relaxed skin tension lines where possible. The area thus outlined was incised deep to adipose tissue with a #15 scalpel blade.  The skin margins were undermined to an appropriate distance in all directions utilizing iris scissors.
Staged Advancement Flap Text: The defect edges were debeveled with a #15 scalpel blade.  Given the location of the defect, shape of the defect and the proximity to free margins a staged advancement flap was deemed most appropriate.  Using a sterile surgical marker, an appropriate advancement flap was drawn incorporating the defect and placing the expected incisions within the relaxed skin tension lines where possible. The area thus outlined was incised deep to adipose tissue with a #15 scalpel blade.  The skin margins were undermined to an appropriate distance in all directions utilizing iris scissors.
Star Wedge Flap Text: The defect edges were debeveled with a #15 scalpel blade.  Given the location of the defect, shape of the defect and the proximity to free margins a star wedge flap was deemed most appropriate.  Using a sterile surgical marker, an appropriate rotation flap was drawn incorporating the defect and placing the expected incisions within the relaxed skin tension lines where possible. The area thus outlined was incised deep to adipose tissue with a #15 scalpel blade.  The skin margins were undermined to an appropriate distance in all directions utilizing iris scissors.
Transposition Flap Text: A transposition flap was designed. Local anesthesia was obtained. The tissue surrounding the operative site was undermined extensively with blunt scissor dissection. The flap was incised to the underlying adipose tissue. The flap was then undermined, elevated, and defatted. Hemostasis was obtained using electrocoagulation. The secondary defect was first closed by complex layered closure, moving the flap into the primary defect. The superficial fascia and subcutaneous layers were closed with buried vertical mattress sutures. The epidermis was then approximated. Careful attention was given to eversion and even approximation of the wound edges. A pressure dressing was applied.
Trilobed Flap Text: A geometric trilobe transposition flap was designed. Local anesthesia was obtained. The flap was incised to the underlying adipose tissue. The flap was then undermined and elevated to its pedicle base. The entire tissue surrounding the operative site was undermined extensively with blunt scissor dissection. On the nose and medial cheek this was performed at the level of the periosteum. Hemostasis was obtained using electrocoagulation. The primary lobe was then defatted using blunt scissor dissection. The secondary lobe was transposed to cover the secondary defect created by the first lobe of the flap. The primary lobe of the flap was then transposed over the primary defect. Both lobes of the flap were trimmed to fit the recipient sites as needed. The tertiary lobe was inset into the tertiary defect. The superficial fascia and subcutaneous layers were closed with buried vertical mattress sutures. The epidermis was approximated. Careful attention was given to eversion and even approximation of the wound edges. A pressure dressing was applied.
V-Y Flap Text: A V-Y advancement flap was designed. Local anesthesia was obtained. The flap was incised to the underlying adipose tissue, and the tissue surrounding the operative site was undermined extensively with blunt scissor dissection. The flap was also undermined in the subcutaneous fat at each pole to increase mobility leaving the center based on a single pedicle. Hemostasis was obtained using electro-coagulation. The flap was secured into place with vicryl sutures. The superficial fascia and subcutaneous layers were closed with buried vertical mattress sutures. The epidermis was then approximated with sutures. Careful attention was given to eversion and even approximation of the edges. A pressure dressing was applied.
V-Y Plasty Text: The defect edges were debeveled with a #15 scalpel blade.  Given the location of the defect, shape of the defect and the proximity to free margins an V-Y advancement flap was deemed most appropriate.  Using a sterile surgical marker, an appropriate advancement flap was drawn incorporating the defect and placing the expected incisions within the relaxed skin tension lines where possible.    The area thus outlined was incised deep to adipose tissue with a #15 scalpel blade.  The skin margins were undermined to an appropriate distance in all directions utilizing iris scissors.
W Plasty Text: The lesion was extirpated to the level of the fat with a #15 scalpel blade.  Given the location of the defect, shape of the defect and the proximity to free margins a W-plasty was deemed most appropriate for repair.  Using a sterile surgical marker, the appropriate transposition arms of the W-plasty were drawn incorporating the defect and placing the expected incisions within the relaxed skin tension lines where possible.    The area thus outlined was incised deep to adipose tissue with a #15 scalpel blade.  The skin margins were undermined to an appropriate distance in all directions utilizing iris scissors.  The opposing transposition arms were then transposed into place in opposite direction and anchored with interrupted buried subcutaneous sutures.
Z Plasty Text: The lesion was extirpated to the level of the fat with a #15 scalpel blade.  Given the location of the defect, shape of the defect and the proximity to free margins a Z-plasty was deemed most appropriate for repair.  Using a sterile surgical marker, the appropriate transposition arms of the Z-plasty were drawn incorporating the defect and placing the expected incisions within the relaxed skin tension lines where possible.    The area thus outlined was incised deep to adipose tissue with a #15 scalpel blade.  The skin margins were undermined to an appropriate distance in all directions utilizing iris scissors.  The opposing transposition arms were then transposed into place in opposite direction and anchored with interrupted buried subcutaneous sutures.
Zygomaticofacial Flap Text: Given the location of the defect, shape of the defect and the proximity to free margins a zygomaticofacial flap was deemed most appropriate for repair.  Using a sterile surgical marker, the appropriate flap was drawn incorporating the defect and placing the expected incisions within the relaxed skin tension lines where possible. The area thus outlined was incised deep to adipose tissue with a #15 scalpel blade with preservation of a vascular pedicle.  The skin margins were undermined to an appropriate distance in all directions utilizing iris scissors.  The flap was then placed into the defect and anchored with interrupted buried subcutaneous sutures.
Abbe Flap (Lower To Upper Lip) Text: The defect of the upper lip was assessed and measured.  Given the location and size of the defect, an Abbe flap was deemed most appropriate.  Using a sterile surgical marker, an appropriate Abbe flap was measured and drawn on the lower lip. Local anesthesia was then infiltrated. A scalpel was then used to incise the upper lip through and through the skin, vermilion, muscle and mucosa, leaving the flap pedicled on the opposite side.  The flap was then rotated and transferred to the lower lip defect.  The flap was then sutured into place with a three layer technique, closing the orbicularis oris muscle layer with subcutaneous buried sutures, followed by a mucosal layer and an epidermal layer.
Abbe Flap (Upper To Lower Lip) Text: The defect of the lower lip was assessed and measured.  Given the location and size of the defect, an Abbe flap was deemed most appropriate.  Using a sterile surgical marker, an appropriate Abbe flap was measured and drawn on the upper lip. Local anesthesia was then infiltrated.  A scalpel was then used to incise the upper lip through and through the skin, vermilion, muscle and mucosa, leaving the flap pedicled on the opposite side.  The flap was then rotated and transferred to the lower lip defect.  The flap was then sutured into place with a three layer technique, closing the orbicularis oris muscle layer with subcutaneous buried sutures, followed by a mucosal layer and an epidermal layer.
Cheek Interpolation Flap Text: A decision was made to reconstruct the defect utilizing an interpolation axial flap and a staged reconstruction.  A telfa template was made of the defect.  This telfa template was then used to outline the Cheek Interpolation flap.  The donor area for the pedicle flap was then injected with anesthesia.  The flap was excised through the skin and subcutaneous tissue down to the layer of the underlying musculature.  The interpolation flap was carefully excised within this deep plane to maintain its blood supply.  The edges of the donor site were undermined.   The donor site was closed in a primary fashion.  The pedicle was then rotated into position and sutured.  Once the tube was sutured into place, adequate blood supply was confirmed with blanching and refill.  The pedicle was then wrapped with xeroform gauze and dressed appropriately with a telfa and gauze bandage to ensure continued blood supply and protect the attached pedicle.
Cheek-To-Nose Interpolation Flap Text: A decision was made to reconstruct the defect utilizing an interpolation axial flap and a staged reconstruction.  A telfa template was made of the defect.  This telfa template was then used to outline the Cheek-To-Nose Interpolation flap.  The donor area for the pedicle flap was then injected with anesthesia.  The flap was excised through the skin and subcutaneous tissue down to the layer of the underlying musculature.  The interpolation flap was carefully excised within this deep plane to maintain its blood supply.  The edges of the donor site were undermined.   The donor site was closed in a primary fashion.  The pedicle was then rotated into position and sutured.  Once the tube was sutured into place, adequate blood supply was confirmed with blanching and refill.  The pedicle was then wrapped with xeroform gauze and dressed appropriately with a telfa and gauze bandage to ensure continued blood supply and protect the attached pedicle.
Estlander Flap (Lower To Upper Lip) Text: The defect of the lower lip was assessed and measured.  Given the location and size of the defect, an Estlander flap was deemed most appropriate.  Using a sterile surgical marker, an appropriate Estlander flap was measured and drawn on the upper lip. Local anesthesia was then infiltrated. A scalpel was then used to incise the lateral aspect of the flap, through skin, muscle and mucosa, leaving the flap pedicled medially.  The flap was then rotated and positioned to fill the lower lip defect.  The flap was then sutured into place with a three layer technique, closing the orbicularis oris muscle layer with subcutaneous buried sutures, followed by a mucosal layer and an epidermal layer.
Interpolation Flap Text: A decision was made to reconstruct the defect utilizing an interpolation axial flap and a staged reconstruction.  A telfa template was made of the defect.  This telfa template was then used to outline the interpolation flap.  The donor area for the pedicle flap was then injected with anesthesia.  The flap was excised through the skin and subcutaneous tissue down to the layer of the underlying musculature.  The interpolation flap was carefully excised within this deep plane to maintain its blood supply.  The edges of the donor site were undermined.   The donor site was closed in a primary fashion.  The pedicle was then rotated into position and sutured.  Once the tube was sutured into place, adequate blood supply was confirmed with blanching and refill.  The pedicle was then wrapped with xeroform gauze and dressed appropriately with a telfa and gauze bandage to ensure continued blood supply and protect the attached pedicle.
Melolabial Interpolation Flap Text: A decision was made to reconstruct the defect utilizing an interpolation axial flap and a staged reconstruction.  A telfa template was made of the defect.  This telfa template was then used to outline the melolabial interpolation flap.  The donor area for the pedicle flap was then injected with anesthesia.  The flap was excised through the skin and subcutaneous tissue down to the layer of the underlying musculature.  The pedicle flap was carefully excised within this deep plane to maintain its blood supply.  The edges of the donor site were undermined.   The donor site was closed in a primary fashion.  The pedicle was then rotated into position and sutured.  Once the tube was sutured into place, adequate blood supply was confirmed with blanching and refill.  The pedicle was then wrapped with xeroform gauze and dressed appropriately with a telfa and gauze bandage to ensure continued blood supply and protect the attached pedicle.
Mastoid Interpolation Flap Text: A decision was made to reconstruct the defect utilizing an interpolation axial flap and a staged reconstruction.  A telfa template was made of the defect.  This telfa template was then used to outline the mastoid interpolation flap.  The donor area for the pedicle flap was then injected with anesthesia.  The flap was excised through the skin and subcutaneous tissue down to the layer of the underlying musculature.  The pedicle flap was carefully excised within this deep plane to maintain its blood supply.  The edges of the donor site were undermined.   The donor site was closed in a primary fashion.  The pedicle was then rotated into position and sutured.  Once the tube was sutured into place, adequate blood supply was confirmed with blanching and refill.  The pedicle was then wrapped with xeroform gauze and dressed appropriately with a telfa and gauze bandage to ensure continued blood supply and protect the attached pedicle.
Paramedian Forehead Flap Text: A decision was made to reconstruct the defect utilizing an interpolation axial flap and a staged reconstruction.  A telfa template was made of the defect.  This telfa template was then used to outline the paramedian forehead pedicle flap.  The donor area for the pedicle flap was then injected with anesthesia.  The flap was excised through the skin and subcutaneous tissue down to the layer of the underlying musculature.  The pedicle flap was carefully excised within this deep plane to maintain its blood supply.  The edges of the donor site were undermined.   The donor site was closed in a primary fashion.  The pedicle was then rotated into position and sutured.  Once the tube was sutured into place, adequate blood supply was confirmed with blanching and refill.  The pedicle was then wrapped with xeroform gauze and dressed appropriately with a telfa and gauze bandage to ensure continued blood supply and protect the attached pedicle.
Posterior Auricular Interpolation Flap Text: A postauricular pedicle flap was designed as a staged reconstruction. An exact template was made of the defect using gentian violet and a non-adherent dressing. The template was then flattened and traced posterior to the ear in the posterior auricular sulcus. The flap was incised to the deep subcutaneous fat and elevated. Hemostasis was obtained using electrocoagulation. The flap was then defatted distally using blunt scissor dissection. The flap was then inserted distally onto the ear while remaining attached laterally. It was sewn into place using non-absorbable sutures. Careful attention was given to eversion and even approximation of the wound edges. The base and pedicle of the flap were wrapped in Vaseline impregnated gauze, and a pressure dressing was applied. The pedicle will be divided and inset in 3-4 weeks. The donor site will heal by second intention.
Cheiloplasty (Complex) Text: A decision was made to reconstruct the defect with a  cheiloplasty.  The defect was undermined extensively.  Additional orbicularis oris muscle was excised with a 15 blade scalpel.  The defect was converted into a full thickness wedge to facilite a better cosmetic result.  Small vessels were then tied off with 5-0 monocyrl. The orbicularis oris, superficial fascia, adipose and dermis were then reapproximated.  After the deeper layers were approximated the epidermis was reapproximated with particular care given to realign the vermilion border.
Cheiloplasty (Less Than 50%) Text: A decision was made to reconstruct the defect with a  cheiloplasty.  The defect was undermined extensively.  Additional orbicularis oris muscle was excised with a 15 blade scalpel.  The defect was converted into a full thickness wedge, of less than 50% of the vertical height of the lip, to facilite a better cosmetic result.  Small vessels were then tied off with 5-0 monocyrl. The orbicularis oris, superficial fascia, adipose and dermis were then reapproximated.  After the deeper layers were approximated the epidermis was reapproximated with particular care given to realign the vermilion border.
Ear Wedge Repair Text: A wedge excision was completed by carrying down an excision through the full thickness of the ear and cartilage with an inward facing Burow's triangle. The wound was then closed in a layered fashion.
Full Thickness Lip Wedge Repair (Flap) Text: Given the location of the defect and the proximity to free margins a full thickness wedge repair was deemed most appropriate.  Using a sterile surgical marker, the appropriate repair was drawn incorporating the defect and placing the expected incisions perpendicular to the vermilion border.  The vermilion border was also meticulously outlined to ensure appropriate reapproximation during the repair.  The area thus outlined was incised through and through with a #15 scalpel blade.  The muscularis and dermis were reaproximated with deep sutures following hemostasis. Care was taken to realign the vermilion border before proceeding with the superficial closure.  Once the vermilion was realigned the superfical and mucosal closure was finished.
Burow's Graft Text: Local anesthesia was obtained. The tissue surrounding the operative site was extensively undermined. A raised cone was removed from one end of the defect to serve as a full thickness skin graft. Hemostasis was obtained using electro-coagulation. This area was then closed by layered closure using buried vertical mattress sutures to close the superficial fascia and subcutaneous fat, and nylon sutures to approximate the epidermis. The obtained graft was placed in saline gauze.  \\nThe graft was then placed onto the primary defect recipient site and secured with fast absorbing gut and nylon basting sutures. The graft was trimmed to fit as needed with blunt scissors. Careful attention was given to even approximation of the wound edges.  A pressure dressing was applied to both surgical sites.
Cartilage Graft Text: A template was made using gentian violet and non-adherent dressing. The template was then drawn onto the donor site of the conchal bowl or helix with gentian violet. Local anesthesia was obtained. A window excision at the donor site was carried down to just above the perichondrium exposing the cartilage. The cartilage graft was then excised and placed in a saline gauze in a petri dish with saline. Hemostasis was obtained using electrocoagulation. The epidermal window was closed with nylon interrupted sutures. Several through and through sutures were also place to close the dead space and prevent hematoma formation. A bolster and pressure were then applied. The cartilage graft was then shaped and trimmed to fit the defect and sutured into place using 6-0 vicryl sutures.
Composite Graft Text: The defect edges were debeveled with a #15 scalpel blade.  Given the location of the defect, shape of the defect, the proximity to free margins and the fact the defect was full thickness a composite graft was deemed most appropriate.  The defect was outline and then transferred to the donor site.  A full thickness graft was then excised from the donor site. The graft was then placed in the primary defect, oriented appropriately and then sutured into place.  The secondary defect was then repaired using a primary closure.
Epidermal Autograft Text: The defect edges were debeveled with a #15 scalpel blade.  Given the location of the defect, shape of the defect and the proximity to free margins an epidermal autograft was deemed most appropriate.  Using a sterile surgical marker, the primary defect shape was transferred to the donor site. The epidermal graft was then harvested.  The skin graft was then placed in the primary defect and oriented appropriately.
Dermal Autograft Text: The defect edges were debeveled with a #15 scalpel blade.  Given the location of the defect, shape of the defect and the proximity to free margins a dermal autograft was deemed most appropriate.  Using a sterile surgical marker, the primary defect shape was transferred to the donor site. The area thus outlined was incised deep to adipose tissue with a #15 scalpel blade.  The harvested graft was then trimmed of adipose and epidermal tissue until only dermis was left.  The skin graft was then placed in the primary defect and oriented appropriately.
Ftsg Text: A wound size template was made using gentian violet and non-adherent dressing. The template was then drawn onto the donor site with gentian violet. Local anesthesia was obtained. The excision at the donor site was carried down to subcutaneous fat. The obtained graft was placed in a saline gauze. Hemostasis was obtained using electrocoagulation. The donor site was closed by layered closure with absorbable and nylon sutures or cyanoacrylate. \\nThe graft was defatted with blunt scissors. The graft was then placed on the recipient site and secured with fast absorbing gut and nylon basting sutures. The graft was trimmed to fit as needed with blunt scissors. Careful attention was given to even approximation of the wound edges. A tied-on bolster pressure dressing was applied to the graft and a standard pressure dressing to the donor site.
Pinch Graft Text: The defect edges were debeveled with a #15 scalpel blade. Given the location of the defect, shape of the defect and the proximity to free margins a pinch graft was deemed most appropriate. Using a sterile surgical marker, the primary defect shape was transferred to the donor site. The area thus outlined was incised deep to adipose tissue with a #15 scalpel blade.  The harvested graft was then trimmed of adipose tissue until only dermis and epidermis was left. The skin margins of the secondary defect were undermined to an appropriate distance in all directions utilizing iris scissors.  The secondary defect was closed with interrupted buried subcutaneous sutures.  The skin edges were then re-apposed with running  sutures.  The skin graft was then placed in the primary defect and oriented appropriately.
Skin Substitute Text: The defect edges were debeveled with a #15 scalpel blade.  Given the location of the defect, shape of the defect and the proximity to free margins a skin substitute graft was deemed most appropriate.  The graft material was trimmed to fit the size of the defect. The graft was then placed in the primary defect and oriented appropriately.
Split-Thickness Skin Graft Text: The defect edges were debeveled with a #15 scalpel blade.  Given the location of the defect, shape of the defect and the proximity to free margins a split thickness skin graft was deemed most appropriate.  Using a sterile surgical marker, the primary defect shape was transferred to the donor site. The split thickness graft was then harvested.  The skin graft was then placed in the primary defect and oriented appropriately.
Tissue Cultured Epidermal Autograft Text: The defect edges were debeveled with a #15 scalpel blade.  Given the location of the defect, shape of the defect and the proximity to free margins a tissue cultured epidermal autograft was deemed most appropriate.  The graft was then trimmed to fit the size of the defect.  The graft was then placed in the primary defect and oriented appropriately.
Xenograft Text: The defect edges were debeveled with a #15 scalpel blade.  Given the location of the defect, shape of the defect and the proximity to free margins a xenograft was deemed most appropriate.  The graft was then trimmed to fit the size of the defect.  The graft was then placed in the primary defect and oriented appropriately.
Complex Repair And Flap Additional Text (Will Appearing After The Standard Complex Repair Text): The complex repair was not sufficient to completely close the primary defect. The remaining additional defect was repaired with the flap mentioned below.
Complex Repair And Graft Additional Text (Will Appearing After The Standard Complex Repair Text): The complex repair was not sufficient to completely close the primary defect. The remaining additional defect was repaired with the graft mentioned below.
Eyelid Full Thickness Repair - 36254: The eyelid defect was full thickness which required a wedge repair of the eyelid. Special care was taken to ensure that the eyelid margin was realligned when placing sutures.
Eyelid Partial Thickness Repair - 50463: The eyelid defect was partial thickness which required a wedge repair of the eyelid. Special care was taken to ensure that the eyelid margin was realligned when placing sutures.
Intermediate Repair And Flap Additional Text (Will Appearing After The Standard Complex Repair Text): The intermediate repair was not sufficient to completely close the primary defect. The remaining additional defect was repaired with the flap mentioned below.
Intermediate Repair And Graft Additional Text (Will Appearing After The Standard Complex Repair Text): The intermediate repair was not sufficient to completely close the primary defect. The remaining additional defect was repaired with the graft mentioned below.
Localized Dermabrasion With 15 Blade Text: The patient was draped in routine manner.  Localized dermabrasion using a 15 blade was performed in routine manner to papillary dermis. This spot dermabrasion is being performed to complete skin cancer reconstruction. It also will eliminate the other sun damaged precancerous cells that are known to be part of the regional effect of a lifetime's worth of sun exposure. This localized dermabrasion is therapeutic and should not be considered cosmetic in any regard.
Localized Dermabrasion With Sand Papertext: The patient was draped in routine manner.  Localized dermabrasion using sterile sand paper was performed in routine manner to papillary dermis. This spot dermabrasion is being performed to complete skin cancer reconstruction. It also will eliminate the other sun damaged precancerous cells that are known to be part of the regional effect of a lifetime's worth of sun exposure. This localized dermabrasion is therapeutic and should not be considered cosmetic in any regard.
Localized Dermabrasion With Wire Brush Text: The patient was draped in routine manner.  Localized dermabrasion using 3 x 17 mm wire brush was performed in routine manner to papillary dermis. This spot dermabrasion is being performed to complete skin cancer reconstruction. It also will eliminate the other sun damaged precancerous cells that are known to be part of the regional effect of a lifetime's worth of sun exposure. This localized dermabrasion is therapeutic and should not be considered cosmetic in any regard.
Purse String (Simple) Text: Given the location of the defect and the characteristics of the surrounding skin a purse string closure was deemed most appropriate.  Undermining was performed circumferentially around the surgical defect.  A purse string suture was then placed and tightened.
Purse String (Intermediate) Text: Given the location of the defect and the characteristics of the surrounding skin a purse string intermediate closure was deemed most appropriate.  Undermining was performed circumferentially around the surgical defect.  A purse string suture was then placed and tightened.
Partial Purse String (Simple) Text: Given the location of the defect and the characteristics of the surrounding skin a simple purse string closure was deemed most appropriate.  Undermining was performed circumferentially around the surgical defect.  A purse string suture was then placed and tightened. Wound tension only allowed a partial closure of the circular defect.
Partial Purse String (Intermediate) Text: Given the location of the defect and the characteristics of the surrounding skin an intermediate purse string closure was deemed most appropriate.  Undermining was performed circumferentially around the surgical defect.  A purse string suture was then placed and tightened. Wound tension only allowed a partial closure of the circular defect.
Tarsorrhaphy Text: A tarsorrhaphy was performed using Frost sutures.
Manual Repair Warning Statement: We plan on removing the manually selected variable below in favor of our much easier automatic structured text blocks found in the previous tab. We decided to do this to help make the flow better and give you the full power of structured data. Manual selection is never going to be ideal in our platform and I would encourage you to avoid using manual selection from this point on, especially since I will be sunsetting this feature. It is important that you do one of two things with the customized text below. First, you can save all of the text in a word file so you can have it for future reference. Second, transfer the text to the appropriate area in the Library tab. Lastly, if there is a flap or graft type which we do not have you need to let us know right away so I can add it in before the variable is hidden. No need to panic, we plan to give you roughly 6 months to make the change.
Same Histology In Subsequent Stages Text: The pattern and morphology of the tumor is as described in the first stage.
No Residual Tumor Seen Histology Text: There were no malignant cells seen in the sections examined.
Inflammation Suggestive Of Cancer Camouflage Histology Text: There was a dense lymphocytic infiltrate which prevented adequate histologic evaluation of adjacent structures (see map). Another Mohs layer is indicated to ensure complete tumor removal.
Area Suspicious For Tumor Histology Text: There is a focus of cells that are suspicious for skin cancer (see map). Another Mohs layer is indicated to ensure complete tumor removal.
Missing Epidermis Histology Text: Epidermis is missing from the stage (see map). Another layer is indicated to check the epidermis for residual tumor since the missing epidermis cannot be visualized.
Bcc Histology Text: Atypical hyperchromatic basaloid cells are present. See map.
Bcc Infiltrative Histology Text: Atypical hyperchromatic basaloid cells are present. See map. The pattern is infiltrative in the dermis with cords, strands, and single cells infiltrating the dermis.
Bcc Micronodular Histology Text: Atypical hyperchromatic basaloid cells are present. See map. The pattern is micronodular in the dermis with cords, strands, and single cells infiltrating the dermis.
Bcc  Nodular Histology Text: Atypical hyperchromatic basaloid cells are present. See map. The pattern is nodular BCC invading the dermis with nests of tumor.
Bcc Superficial Histology Text: Atypical hyperchromatic basaloid cells are present. See map. The pattern is superficial BCC along the dermal-epidermal junction with superficial buds of tumor coming off the basal cell layer of the epidermis.
Mixed Superficial And Nodular Bcc Histology Text: Atypical hyperchromatic basaloid cells are present. See map. The pattern is superficial BCC along the dermal-epidermal junction with superficial buds of tumor coming off the basal cell layer of the epidermis and nodular BCC invading the dermis with nests of tumor.
Mixed Nodular And Infiltrative Bcc Histology Text: Atypical hyperchromatic basaloid cells are present. See map. The pattern is nodular BCC invading the dermis with nests of tumor and infiltrative in the dermis with cords, strands, and single cells infiltrating the dermis.
Metatypical Bcc Histology Text: Atypical hyperchromatic basaloid cells are present. See map. The pattern is nodular BCC invading the dermis with nests of tumor. Focal squamous differentiation is noted.
Scc Histology Text: Atypical squamous cells (keratinocytes) are present in the dermis. See map. Nests and cords of tumor are invading the dermis. See map.
Scc Well Differentiated Histology Text: Atypical squamous cells (keratinocytes) are present in the dermis. See map. The tumor is well differentiated. Nests and cords of tumor are invading the dermis. See map.
Scc Moderately Differentiated Histology Text: Atypical squamous cells (keratinocytes) are present in the dermis. See map. The tumor is moderately differentiated. Nests and cords of tumor are invading the dermis. See map.
Scc Poorly Differentiated Histology Text: Atypical squamous cells (keratinocytes) are present in the dermis. See map. The tumor is poorly differentiated. Nests and cords of tumor are invading the dermis. See map.
Scc Spindle Histology Text: Atypical squamous cells (keratinocytes) are present in the dermis. See map. The tumor is spindle celled. Nests and cords of tumor are invading the dermis. See map.
Scc In Situ Histology Text: Atypical squamous cells (keratinocytes) are present in the epidermis of section 1 in full thickness atypia. Cells are enlarged with hyperchromatic nuclei. See map.
Scc In Situ With Follicular Extension Histology Text: Atypical squamous cells (keratinocytes) are present in the epidermis of section 1 in full thickness atypia. Cells are enlarged with hyperchromatic nuclei and are extedning downward into hair follicles. See map.
Afx Histology Text: Atypical cells are present in the dermis (see map). See map. There is a dermal-based tumor composed of pleomorphic, irregularly arranged, spindled to epithelioid cells with numerous mitotic figures. Occasional giant cells are seen.
Mart-1 - Positive Histology Text: MART-1 staining demonstrates areas of higher density and clustering of melanocytes with Pagetoid spread upwards within the epidermis. The surgical margins are positive for tumor cells.
Mart-1 - Negative Histology Text: MART-1 staining demonstrates a normal density and pattern of melanocytes along the dermal-epidermal junction. The surgical margins are negative for tumor cells.
Clia Id #: [id number]
Information: Selecting Yes will display possible errors in your note based on the variables you have selected. This validation is only offered as a suggestion for you. PLEASE NOTE THAT THE VALIDATION TEXT WILL BE REMOVED WHEN YOU FINALIZE YOUR NOTE. IF YOU WANT TO FAX A PRELIMINARY NOTE YOU WILL NEED TO TOGGLE THIS TO 'NO' IF YOU DO NOT WANT IT IN YOUR FAXED NOTE.
Bill 59 Modifier?: No - Continue to Bill 79 Modifier

## 2024-08-23 NOTE — PROCEDURE: CONSULTATION FOR MOHS SURGERY
Detail Level: Detailed
Body Location Override (Optional - Billing Will Still Be Based On Selected Body Map Location If Applicable): right helix
Size Of Lesion: 0.5
X Size Of Lesion In Cm (Optional): 0.6
Name Of The Referring Provider For Procedure: Judy Marley MD
Incorporate Mauc In Note: Yes

## 2024-09-17 ENCOUNTER — APPOINTMENT (OUTPATIENT)
Dept: URBAN - METROPOLITAN AREA SURGERY 17 | Age: 69
Setting detail: DERMATOLOGY
End: 2024-09-18

## 2024-09-17 DIAGNOSIS — Z48.817 ENCOUNTER FOR SURGICAL AFTERCARE FOLLOWING SURGERY ON THE SKIN AND SUBCUTANEOUS TISSUE: ICD-10-CM

## 2024-09-17 PROCEDURE — OTHER POST-OP WOUND EVALUATION: OTHER

## 2024-09-17 PROCEDURE — 99024 POSTOP FOLLOW-UP VISIT: CPT

## 2024-09-17 ASSESSMENT — LOCATION ZONE DERM: LOCATION ZONE: EAR

## 2024-09-17 ASSESSMENT — LOCATION SIMPLE DESCRIPTION DERM: LOCATION SIMPLE: RIGHT EAR

## 2024-09-17 ASSESSMENT — LOCATION DETAILED DESCRIPTION DERM: LOCATION DETAILED: RIGHT POSTERIOR EAR

## 2024-09-17 NOTE — PROCEDURE: POST-OP WOUND EVALUATION
Detail Level: Detailed
Add 05207 Cpt? (Important Note: In 2017 The Use Of 32052 Is Being Tracked By Cms To Determine Future Global Period Reimbursement For Global Periods): yes
Wound Evaluated By (Optional): Dr. Blanton
Wound Diameter In Cm(Optional): 0